# Patient Record
(demographics unavailable — no encounter records)

---

## 2025-02-27 NOTE — PHYSICAL EXAM
[Well-appearing] : well-appearing [Normocephalic] : normocephalic [No dysmorphic facial features] : no dysmorphic facial features [Neck supple] : neck supple [Straight] : straight [No deformities] : no deformities [Alert] : alert [Well related, good eye contact] : well related, good eye contact [Conversant] : conversant [Normal speech and language] : normal speech and language [Follows instructions well] : follows instructions well [VFF] : VFF [Pupils reactive to light and accommodation] : pupils reactive to light and accommodation [Full extraocular movements] : full extraocular movements [Normal facial sensation to light touch] : normal facial sensation to light touch [No facial asymmetry or weakness] : no facial asymmetry or weakness [Gross hearing intact] : gross hearing intact [Equal palate elevation] : equal palate elevation [Good shoulder shrug] : good shoulder shrug [Normal tongue movement] : normal tongue movement [Midline tongue, no fasciculations] : midline tongue, no fasciculations [Normal axial and appendicular muscle tone] : normal axial and appendicular muscle tone [Gets up on table without difficulty] : gets up on table without difficulty [No pronator drift] : no pronator drift [Normal finger tapping and fine finger movements] : normal finger tapping and fine finger movements [No abnormal involuntary movements] : no abnormal involuntary movements [5/5 strength in proximal and distal muscles of arms and legs] : 5/5 strength in proximal and distal muscles of arms and legs [Walks and runs well] : walks and runs well [Able to do deep knee bend] : able to do deep knee bend [Able to walk on heels] : able to walk on heels [Able to walk on toes] : able to walk on toes [2+ biceps] : 2+ biceps [Knee jerks] : knee jerks [Localizes LT and temperature] : localizes LT and temperature [No dysmetria on FTNT] : no dysmetria on FTNT [Good walking balance] : good walking balance [Normal gait] : normal gait [Able to tandem well] : able to tandem well [Negative Romberg] : negative Romberg [de-identified] : Breathing even and unlabored

## 2025-02-27 NOTE — BIRTH HISTORY
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

## 2025-02-27 NOTE — PLAN
[FreeTextEntry1] : [ ] [place] questionnaires given to mother for parent and teacher [ ] Discussed use of Omega 3 fish oil and magnesium  [ ] Given PCIT.org for resource  [ ] Discussed use of medications as well as side effects if accommodations do not improve school performance [ ] Follow up 1 month to review [place] questionnaires, call main office to schedule

## 2025-02-27 NOTE — HISTORY OF PRESENT ILLNESS
[FreeTextEntry1] : JAMES is a 5 year old male here for initial evaluation of inattention.   According to Adopted mother. James was adopted days after birth. 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 Academically, he has no issues, he is on grade level or above. Teacher has extra rewards in place to help keep him on task, such as an incentive chart. Teacher gives him jobs around the classroom to allow for movement. From early age, parent was concerned for ADHD. He is out of the seat often, constantly fidgeting and has trouble remaining on task. He has issues with boundaries and personal space. Requires a lot of prompting and redirection but he is not oppositional at school.   Educational assessment:  Current Grade:   Current District: [facility] ED/ Current Accommodations/ICT: General education  [place] [facility] [place] while watching tv, James is often upside down. Parent feels she is correcting behaviors all day. He is impulsive may hit parent or get into someone's face when upset. Issues with emotional regulation. He participates in a lot of sports; he can focus at first then he lacks focus and not motivated. He sees a counselor biweekly at this time, to talk about what to do when he is angry. He has limited screen time. He can do homework quickly when he wants to, but he is easily distracted by his surroundings or may openly refuse it.  He requires a lot of prompting and redirection in the morning but if he wakes up well, he can go through his routine. He struggles to stay still during meals. He has an older sister who is 7 year old, argue often. Socially, he does well with others, but other times he is off alone. No concern for anxiety, depression, OCD, ODD. He is remorseful when he behaves inappropriately. When he gets very mad, he cannot control it. Martial arts 2x/wk in the evening.  He had mild sleep apnea during a sleep study, pending tonsillectomy.  He is a restless sleeper, moves often according to parent. Bedtime: 8pm falls asleep, falls asleep right away. He wakes up at 7 am.  He can do a multistep direction if he wants to.  Denies staring, eye fluttering, twitching, seizure or seizure-like activity. No serious head injury, meningoencephalitis.

## 2025-02-27 NOTE — HISTORY OF PRESENT ILLNESS
[FreeTextEntry1] : JAMES is a 5 year old male here for initial evaluation of inattention.   According to Adopted mother. James was adopted days after birth. 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 Academically, he has no issues, he is on grade level or above. Teacher has extra rewards in place to help keep him on task, such as an incentive chart. Teacher gives him jobs around the classroom to allow for movement. From early age, parent was concerned for ADHD. He is out of the seat often, constantly fidgeting and has trouble remaining on task. He has issues with boundaries and personal space. Requires a lot of prompting and redirection but he is not oppositional at school.   Educational assessment:  Current Grade:   Current District: [facility] ED/ Current Accommodations/ICT: General education  [place] [facility] [place], while watching tv, James is often upside down. Parent feels she is correcting behaviors all day. He is impulsive may hit parent or get into someone's face when upset. Issues with emotional regulation. He participates in a lot of sports; he can focus at first then he lacks focus and not motivated. He sees a counselor biweekly at this time, to talk about what to do when he is angry. He has limited screen time. He can do homework quickly when he wants to, but he is easily distracted by his surroundings or may openly refuse it.  He requires a lot of prompting and redirection in the morning but if he wakes up well, he can go through his routine. He struggles to stay still during meals. He has an older sister who is 7 year old, argue often. Socially, he does well with others, but other times he is off alone. No concern for anxiety, depression, OCD, ODD. He is remorseful when he behaves inappropriately. When he gets very mad, he cannot control it. Martial arts 2x/wk in the evening.  He had mild sleep apnea during a sleep study, pending tonsillectomy.  He is a restless sleeper, moves often according to parent. Bedtime: 8pm falls asleep, falls asleep right away. He wakes up at 7 am.  He can do a multistep direction if he wants to.  Denies staring, eye fluttering, twitching, seizure or seizure-like activity. No serious head injury, meningoencephalitis.

## 2025-02-27 NOTE — PLAN
[FreeTextEntry1] : [ ] [place] questionnaires given to mother for parent and teacher [ ] Discussed use of Omega 3 fish oil and magnesium  [ ] Given PCIT.org for resource  [ ] Discussed use of medications as well as side effects if accommodations do not improve school performance [ ] Follow up 1 month to review [place] questionnaires, call main office to schedule

## 2025-02-27 NOTE — ASSESSMENT
[FreeTextEntry1] :  EMILIO is a 5 year old male presenting for initial evaluation of inattention/hyperactivity   EMILIO is in a general education  classroom setting in [facility].   No concerns for staring episodes, twitching, rapid eye blinking or seizure-like activity. Non focal neurological exam. Will proceed with ADHD evaluation using [place] forms.

## 2025-02-27 NOTE — PHYSICAL EXAM
[Well-appearing] : well-appearing [Normocephalic] : normocephalic [No dysmorphic facial features] : no dysmorphic facial features [Neck supple] : neck supple [Straight] : straight [No deformities] : no deformities [Alert] : alert [Well related, good eye contact] : well related, good eye contact [Conversant] : conversant [Normal speech and language] : normal speech and language [Follows instructions well] : follows instructions well [VFF] : VFF [Pupils reactive to light and accommodation] : pupils reactive to light and accommodation [Full extraocular movements] : full extraocular movements [Normal facial sensation to light touch] : normal facial sensation to light touch [No facial asymmetry or weakness] : no facial asymmetry or weakness [Gross hearing intact] : gross hearing intact [Equal palate elevation] : equal palate elevation [Good shoulder shrug] : good shoulder shrug [Normal tongue movement] : normal tongue movement [Midline tongue, no fasciculations] : midline tongue, no fasciculations [Normal axial and appendicular muscle tone] : normal axial and appendicular muscle tone [Gets up on table without difficulty] : gets up on table without difficulty [No pronator drift] : no pronator drift [Normal finger tapping and fine finger movements] : normal finger tapping and fine finger movements [No abnormal involuntary movements] : no abnormal involuntary movements [5/5 strength in proximal and distal muscles of arms and legs] : 5/5 strength in proximal and distal muscles of arms and legs [Walks and runs well] : walks and runs well [Able to do deep knee bend] : able to do deep knee bend [Able to walk on heels] : able to walk on heels [Able to walk on toes] : able to walk on toes [2+ biceps] : 2+ biceps [Knee jerks] : knee jerks [Localizes LT and temperature] : localizes LT and temperature [No dysmetria on FTNT] : no dysmetria on FTNT [Good walking balance] : good walking balance [Normal gait] : normal gait [Able to tandem well] : able to tandem well [Negative Romberg] : negative Romberg [de-identified] : Breathing even and unlabored

## 2025-02-27 NOTE — ASSESSMENT
[FreeTextEntry1] :  EMILIO is a 5 year old male presenting for initial evaluation of inattention/hyperactivity   EMILIO is in a general education  classroom setting in [facility].   No concerns for staring episodes, twitching, rapid eye blinking or seizure-like activity. Non focal neurological exam. Will proceed with ADHD evaluation using [place] forms.

## 2025-05-01 NOTE — REASON FOR VISIT
[Follow-Up Evaluation] : a follow-up evaluation for [Mother] : mother [Home] : at home, [unfilled] , at the time of the visit. [Medical Office: ([facility])___] : at the medical office located in  [Telehealth (audio & video)] : This visit was provided via telehealth using real-time 2-way audio visual technology. [FreeTextEntry2] : inattention, hyperactivity  [FreeTextEntry3] : Mother

## 2025-05-01 NOTE — BIRTH HISTORY
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

## 2025-05-01 NOTE — REASON FOR VISIT
[Follow-Up Evaluation] : a follow-up evaluation for [Mother] : mother [Home] : at home, [unfilled] , at the time of the visit. [Medical Office: ([facility])___] : at the medical office located in  [Telehealth (audio & video)] : This visit was provided via telehealth using real-time 2-way audio visual technology. [FreeTextEntry2] : inattention, hyperactivity  [FreeTextEntry3] : Mother

## 2025-05-01 NOTE — CONSULT LETTER
[Dear  ___] : Dear  [unfilled], [Please see my note below.] : Please see my note below. [Sincerely,] : Sincerely, [FreeTextEntry3] : Eva Bennett CPNP Certified Pediatric Nurse Practitioner  Pediatric Neurology  [facility]

## 2025-05-01 NOTE — HISTORY OF PRESENT ILLNESS
[FreeTextEntry1] : JAMES is a 6 year old male here for follow  up evaluation of ADHD combined type.  Current Grade:   Current District: [facility] ED/ Current Accommodations/ICT: General education  James was last seen in 2/2025. Mother notes he has been doing okay since last visit.  He is doing very well academically in school. Mother notes he has very accommodating teachers and therefore there have been minimal behavior concerns from teachers. His home behavior is very difficult to manage.  He is very oppositional and will give an attitude to parents, give a lot of back talk and can sometimes be aggressive towards parents.  Mother notes it is very difficult to take him out in public as he will try to run off and is difficult to manage due to impulsivity. He struggles to get  along with his sister.  Mother notes even in the house he cannot be left unsupervised for extended periods of time therefore Mother typically will not shower unless Father is home to supervise him.  While he is able to complete homework without difficulty it often turns into a fight. Socially he does better in unfamiliar situations as once he is comfortable, he can become mean and cannot compromise.   [place] questionnaires were completed after last visit by parents and teacher.    Parents responses:  Inattention 9/9- (6/9).    Hyperactivity 9/9 (6/9)  ODD: 4/8. (4/8)  Conduct disorder: 0/14 (3/14)  Anxiety/ Depression: 3/7 (3/7)    Teachers responses:   Inattention 9/9- (6/9).    Hyperactivity 7/9 (6/9)  ODD/ Conduct: 0/10. (3/10)  Anxiety/ Depression: 1/7 (3/7 )    Performance questions: Parents: Areas of concern include relationship with parents, siblings and peers.  Teachers: Areas of concern include following directions, disrupting class, assignment completion and organizational skills.  Initial Visit:  According to Adopted mother. James was adopted days after birth. 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 Academically, he has no issues, he is on grade level or above. Teacher has extra rewards in place to help keep him on task, such as an incentive chart. Teacher gives him jobs around the classroom to allow for movement. From early age, parent was concerned for ADHD. He is out of the seat often, constantly fidgeting and has trouble remaining on task. He has issues with boundaries and personal space. Requires a lot of prompting and redirection but he is not oppositional at school.   Educational assessment:  Current Grade:   Current District: [facility]/ Current Accommodations/ICT: General education  Home assessment: [facility] [place] notes, while watching tv, James is often upside down. Parent feels she is correcting behaviors all day. He is impulsive may hit parent or get into someone's face when upset. Issues with emotional regulation. He participates in a lot of sports; he can focus at first then he lacks focus and not motivated. He sees a counselor biweekly at this time, to talk about what to do when he is angry. He has limited screen time. He can do homework quickly when he wants to, but he is easily distracted by his surroundings or may openly refuse it.  He requires a lot of prompting and redirection in the morning but if he wakes up well, he can go through his routine. He struggles to stay still during meals. He has an older sister who is 7 year old, argue often. Socially, he does well with others, but other times he is off alone. No concern for anxiety, depression, OCD, ODD. He is remorseful when he behaves inappropriately. When he gets very mad, he cannot control it. Martial arts 2x/wk in the evening.  He had mild sleep apnea during a sleep study, pending tonsillectomy.  He is a restless sleeper, moves often according to parent. Bedtime: 8pm falls asleep, falls asleep right away. He wakes up at 7 am.  He can do a multistep direction if he wants to.  Denies staring, eye fluttering, twitching, seizure or seizure-like activity. No serious head injury, meningoencephalitis.

## 2025-05-01 NOTE — CONSULT LETTER
[Dear  ___] : Dear  [unfilled], [Please see my note below.] : Please see my note below. [Sincerely,] : Sincerely, [FreeTextEntry3] : Eva Bennett CPNP Certified Pediatric Nurse Practitioner  Pediatric Neurology  [facility]

## 2025-05-01 NOTE — BIRTH HISTORY
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

## 2025-05-01 NOTE — PHYSICAL EXAM
[Neck supple] : neck supple [Straight] : straight [No deformities] : no deformities [Follows instructions well] : follows instructions well [VFF] : VFF [Pupils reactive to light and accommodation] : pupils reactive to light and accommodation [Full extraocular movements] : full extraocular movements [Normal facial sensation to light touch] : normal facial sensation to light touch [No facial asymmetry or weakness] : no facial asymmetry or weakness [Gross hearing intact] : gross hearing intact [Equal palate elevation] : equal palate elevation [Good shoulder shrug] : good shoulder shrug [Normal tongue movement] : normal tongue movement [Midline tongue, no fasciculations] : midline tongue, no fasciculations [Normal axial and appendicular muscle tone] : normal axial and appendicular muscle tone [Gets up on table without difficulty] : gets up on table without difficulty [No pronator drift] : no pronator drift [Normal finger tapping and fine finger movements] : normal finger tapping and fine finger movements [5/5 strength in proximal and distal muscles of arms and legs] : 5/5 strength in proximal and distal muscles of arms and legs [Walks and runs well] : walks and runs well [Able to do deep knee bend] : able to do deep knee bend [Able to walk on heels] : able to walk on heels [Able to walk on toes] : able to walk on toes [2+ biceps] : 2+ biceps [Knee jerks] : knee jerks [Localizes LT and temperature] : localizes LT and temperature [No dysmetria on FTNT] : no dysmetria on FTNT [Good walking balance] : good walking balance [Able to tandem well] : able to tandem well [Negative Romberg] : negative Romberg [de-identified] : Breathing even and unlabored  [Well-appearing] : well-appearing [Normocephalic] : normocephalic [No dysmorphic facial features] : no dysmorphic facial features [Alert] : alert [Well related, good eye contact] : well related, good eye contact [Conversant] : conversant [Normal speech and language] : normal speech and language [No abnormal involuntary movements] : no abnormal involuntary movements [Normal gait] : normal gait

## 2025-05-01 NOTE — BIRTH HISTORY
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

## 2025-05-01 NOTE — ASSESSMENT
[FreeTextEntry1] :  EMILIO is a 6 year old male with increasing concerns for inattention and hyperactivity at home and in school.  Also with concerns for defiant behaviors primarily in home.   Recent questionnaires completed by parents and teachers consistent with diagnosis of ADHD combined type. Mother would like to give a good trial of Omega 3 before discussing prescription medication.

## 2025-05-01 NOTE — PHYSICAL EXAM
[Neck supple] : neck supple [Straight] : straight [No deformities] : no deformities [Follows instructions well] : follows instructions well [VFF] : VFF [Pupils reactive to light and accommodation] : pupils reactive to light and accommodation [Full extraocular movements] : full extraocular movements [Normal facial sensation to light touch] : normal facial sensation to light touch [No facial asymmetry or weakness] : no facial asymmetry or weakness [Gross hearing intact] : gross hearing intact [Equal palate elevation] : equal palate elevation [Good shoulder shrug] : good shoulder shrug [Normal tongue movement] : normal tongue movement [Midline tongue, no fasciculations] : midline tongue, no fasciculations [Normal axial and appendicular muscle tone] : normal axial and appendicular muscle tone [Gets up on table without difficulty] : gets up on table without difficulty [No pronator drift] : no pronator drift [Normal finger tapping and fine finger movements] : normal finger tapping and fine finger movements [5/5 strength in proximal and distal muscles of arms and legs] : 5/5 strength in proximal and distal muscles of arms and legs [Walks and runs well] : walks and runs well [Able to do deep knee bend] : able to do deep knee bend [Able to walk on heels] : able to walk on heels [Able to walk on toes] : able to walk on toes [2+ biceps] : 2+ biceps [Knee jerks] : knee jerks [Localizes LT and temperature] : localizes LT and temperature [No dysmetria on FTNT] : no dysmetria on FTNT [Good walking balance] : good walking balance [Able to tandem well] : able to tandem well [Negative Romberg] : negative Romberg [de-identified] : Breathing even and unlabored  [Well-appearing] : well-appearing [Normocephalic] : normocephalic [No dysmorphic facial features] : no dysmorphic facial features [Alert] : alert [Well related, good eye contact] : well related, good eye contact [Conversant] : conversant [Normal speech and language] : normal speech and language [No abnormal involuntary movements] : no abnormal involuntary movements [Normal gait] : normal gait

## 2025-05-01 NOTE — CONSULT LETTER
[Dear  ___] : Dear  [unfilled], [Please see my note below.] : Please see my note below. [Sincerely,] : Sincerely, [FreeTextEntry3] : Eva Bennett CPNP Certified Pediatric Nurse Practitioner  Pediatric Neurology  [facility]

## 2025-05-01 NOTE — HISTORY OF PRESENT ILLNESS
[FreeTextEntry1] : JAMES is a 6 year old male here for follow  up evaluation of ADHD combined type.  Current Grade:   Current District: [facility] ED/ Current Accommodations/ICT: General education  James was last seen in 2/2025. Mother notes he has been doing okay since last visit.  He is doing very well academically in school. Mother notes he has very accommodating teachers and therefore there have been minimal behavior concerns from teachers. His home behavior is very difficult to manage.  He is very oppositional and will give an attitude to parents, give a lot of back talk and can sometimes be aggressive towards parents.  Mother notes it is very difficult to take him out in public as he will try to run off and is difficult to manage due to impulsivity. He struggles to get  along with his sister.  Mother notes even in the house he cannot be left unsupervised for extended periods of time therefore Mother typically will not shower unless Father is home to supervise him.  While he is able to complete homework without difficulty it often turns into a fight. Socially he does better in unfamiliar situations as once he is comfortable, he can become mean and cannot compromise.   [place] questionnaires were completed after last visit by parents and teacher.    Parents responses:  Inattention 9/9- (6/9).    Hyperactivity 9/9 (6/9)  ODD: 4/8. (4/8)  Conduct disorder: 0/14 (3/14)  Anxiety/ Depression: 3/7 (3/7)    Teachers responses:   Inattention 9/9- (6/9).    Hyperactivity 7/9 (6/9)  ODD/ Conduct: 0/10. (3/10)  Anxiety/ Depression: 1/7 (3/7 )    Performance questions: Parents: Areas of concern include relationship with parents, siblings and peers.  Teachers: Areas of concern include following directions, disrupting class, assignment completion and organizational skills.  Initial Visit:  According to Adopted mother. James was adopted days after birth. 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 Academically, he has no issues, he is on grade level or above. Teacher has extra rewards in place to help keep him on task, such as an incentive chart. Teacher gives him jobs around the classroom to allow for movement. From early age, parent was concerned for ADHD. He is out of the seat often, constantly fidgeting and has trouble remaining on task. He has issues with boundaries and personal space. Requires a lot of prompting and redirection but he is not oppositional at school.   Educational assessment:  Current Grade:   Current District: [facility]/ Current Accommodations/ICT: General education  [place] [facility] [place] while [place] tv, James is often upside down. Parent feels she is correcting behaviors all day. He is impulsive may hit parent or get into someone's face when upset. Issues with emotional regulation. He participates in a lot of sports; he can focus at first then he lacks focus and not motivated. He sees a counselor biweekly at this time, to talk about what to do when he is angry. He has limited screen time. He can do homework quickly when he wants to, but he is easily distracted by his surroundings or may openly refuse it.  He requires a lot of prompting and redirection in the morning but if he wakes up well, he can go through his routine. He struggles to stay still during meals. He has an older sister who is 7 year old, argue often. Socially, he does well with others, but other times he is off alone. No concern for anxiety, depression, OCD, ODD. He is remorseful when he behaves inappropriately. When he gets very mad, he cannot control it. Martial arts 2x/wk in the evening.  He had mild sleep apnea during a sleep study, pending tonsillectomy.  He is a restless sleeper, moves often according to parent. Bedtime: 8pm falls asleep, falls asleep right away. He wakes up at 7 am.  He can do a multistep direction if he wants to.  Denies staring, eye fluttering, twitching, seizure or seizure-like activity. No serious head injury, meningoencephalitis.

## 2025-05-01 NOTE — PHYSICAL EXAM
[Neck supple] : neck supple [Straight] : straight [No deformities] : no deformities [Follows instructions well] : follows instructions well [VFF] : VFF [Pupils reactive to light and accommodation] : pupils reactive to light and accommodation [Full extraocular movements] : full extraocular movements [Normal facial sensation to light touch] : normal facial sensation to light touch [No facial asymmetry or weakness] : no facial asymmetry or weakness [Gross hearing intact] : gross hearing intact [Equal palate elevation] : equal palate elevation [Good shoulder shrug] : good shoulder shrug [Normal tongue movement] : normal tongue movement [Midline tongue, no fasciculations] : midline tongue, no fasciculations [Normal axial and appendicular muscle tone] : normal axial and appendicular muscle tone [Gets up on table without difficulty] : gets up on table without difficulty [No pronator drift] : no pronator drift [Normal finger tapping and fine finger movements] : normal finger tapping and fine finger movements [5/5 strength in proximal and distal muscles of arms and legs] : 5/5 strength in proximal and distal muscles of arms and legs [Walks and runs well] : walks and runs well [Able to do deep knee bend] : able to do deep knee bend [Able to walk on heels] : able to walk on heels [Able to walk on toes] : able to walk on toes [2+ biceps] : 2+ biceps [Knee jerks] : knee jerks [Localizes LT and temperature] : localizes LT and temperature [No dysmetria on FTNT] : no dysmetria on FTNT [Good walking balance] : good walking balance [Able to tandem well] : able to tandem well [Negative Romberg] : negative Romberg [de-identified] : Breathing even and unlabored  [Well-appearing] : well-appearing [Normocephalic] : normocephalic [No dysmorphic facial features] : no dysmorphic facial features [Alert] : alert [Well related, good eye contact] : well related, good eye contact [Conversant] : conversant [Normal speech and language] : normal speech and language [No abnormal involuntary movements] : no abnormal involuntary movements [Normal gait] : normal gait

## 2025-05-01 NOTE — REASON FOR VISIT
[Follow-Up Evaluation] : a follow-up evaluation for [Mother] : mother [Home] : at home, [unfilled] , at the time of the visit. [Medical Office: ([facility])___] : at the medical office located in  [Telehealth (audio & video)] : This visit was provided via telehealth using real-time 2-way audio visual technology. [FreeTextEntry2] : inattention, hyperactivity  [FreeTextEntry3] : Mother

## 2025-05-01 NOTE — HISTORY OF PRESENT ILLNESS
[FreeTextEntry1] : JAMES is a 6 year old male here for follow  up evaluation of ADHD combined type.  Current Grade:   Current District: [facility] ED/ Current Accommodations/ICT: General education  James was last seen in 2/2025. Mother notes he has been doing okay since last visit.  He is doing very well academically in school. Mother notes he has very accommodating teachers and therefore there have been minimal behavior concerns from teachers. His home behavior is very difficult to manage.  He is very oppositional and will give an attitude to parents, give a lot of back talk and can sometimes be aggressive towards parents.  Mother notes it is very difficult to take him out in public as he will try to run off and is difficult to manage due to impulsivity. He struggles to get  along with his sister.  Mother notes even in the house he cannot be left unsupervised for extended periods of time therefore Mother typically will not shower unless Father is home to supervise him.  While he is able to complete homework without difficulty it often turns into a fight. Socially he does better in unfamiliar situations as once he is comfortable, he can become mean and cannot compromise.   [place] questionnaires were completed after last visit by parents and teacher.    Parents responses:  Inattention 9/9- (6/9).    Hyperactivity 9/9 (6/9)  ODD: 4/8. (4/8)  Conduct disorder: 0/14 (3/14)  Anxiety/ Depression: 3/7 (3/7)    Teachers responses:   Inattention 9/9- (6/9).    Hyperactivity 7/9 (6/9)  ODD/ Conduct: 0/10. (3/10)  Anxiety/ Depression: 1/7 (3/7 )    Performance questions: Parents: Areas of concern include relationship with parents, siblings and peers.  Teachers: Areas of concern include following directions, disrupting class, assignment completion and organizational skills.  Initial Visit:  According to Adopted mother. James was adopted days after birth. 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 Academically, he has no issues, he is on grade level or above. Teacher has extra rewards in place to help keep him on task, such as an incentive chart. Teacher gives him jobs around the classroom to allow for movement. From early age, parent was concerned for ADHD. He is out of the seat often, constantly fidgeting and has trouble remaining on task. He has issues with boundaries and personal space. Requires a lot of prompting and redirection but he is not oppositional at school.   Educational assessment:  Current Grade:   Current District: [facility] General ED/ Current Accommodations/ICT: General education  Home assessment: [facility] notes, while watching tv, James is often upside down. Parent feels she is correcting behaviors all day. He is impulsive may hit parent or get into someone's face when upset. Issues with emotional regulation. He participates in a lot of sports; he can focus at first then he lacks focus and not motivated. He sees a counselor biweekly at this time, to talk about what to do when he is angry. He has limited screen time. He can do homework quickly when he wants to, but he is easily distracted by his surroundings or may openly refuse it.  He requires a lot of prompting and redirection in the morning but if he wakes up well, he can go through his routine. He struggles to stay still during meals. He has an older sister who is 7 year old, argue often. Socially, he does well with others, but other times he is off alone. No concern for anxiety, depression, OCD, ODD. He is remorseful when he behaves inappropriately. When he gets very mad, he cannot control it. Martial arts 2x/wk in the evening.  He had mild sleep apnea during a sleep study, pending tonsillectomy.  He is a restless sleeper, moves often according to parent. Bedtime: 8pm falls asleep, falls asleep right away. He wakes up at 7 am.  He can do a multistep direction if he wants to.  Denies staring, eye fluttering, twitching, seizure or seizure-like activity. No serious head injury, meningoencephalitis.

## 2025-05-01 NOTE — PLAN
[FreeTextEntry1] :  [ ] Discussed use of Omega 3 fish oil and magnesium  [ ] Discussed use of medications as well as side effects if accommodations do not improve school performance [ ] Follow up 6 months- sooner for concerns

## 2025-07-24 NOTE — PHYSICAL EXAM
[Well-appearing] : well-appearing [Normocephalic] : normocephalic [No dysmorphic facial features] : no dysmorphic facial features [Alert] : alert [Well related, good eye contact] : well related, good eye contact [Conversant] : conversant [Normal speech and language] : normal speech and language [No abnormal involuntary movements] : no abnormal involuntary movements [Normal gait] : normal gait

## 2025-07-25 NOTE — PLAN
[FreeTextEntry1] : [ ] Discussed use of Omega 3 fish oil and magnesium  -Discussed with parents trial of stimulant for ADHD management in addition to school accommodations.  Parents in agreement with plan.  Will start Metadate 10mg. Discussed potential side effects of stimulant medications including but not limited to increased heart rate and blood pressure, decreased appetite, decreased growth velocity, headache, stomachache, delayed sleep onset, tics, moodiness, etc. -Cardiac risk factors for treatment of stimulant medications were reviewed, including history of prior seizure, unexplained loss of consciousness, congenital heart disease, arrhythmias, or family history of sudden unexplained cardiac death in family members below the age of 40.  - Follow up 1 month via TEB.  Parents to call office for concerns of side effects

## 2025-07-25 NOTE — REASON FOR VISIT
[Follow-Up Evaluation] : a follow-up evaluation for [Mother] : mother [Home] : at home, [unfilled] , at the time of the visit. [Medical Office: ([facility])___] : at the medical office located in  [Telehealth (audio & video)] : This visit was provided via telehealth using real-time 2-way audio visual technology. [FreeTextEntry2] : inattention, hyperactivity  [FreeTextEntry3] : Mother

## 2025-07-25 NOTE — CONSULT LETTER
[Dear  ___] : Dear  [unfilled], [Please see my note below.] : Please see my note below. [Sincerely,] : Sincerely, [FreeTextEntry3] : Eva Bennett CPNP Certified Pediatric Nurse Practitioner  Pediatric Neurology  [facility]

## 2025-07-25 NOTE — HISTORY OF PRESENT ILLNESS
[FreeTextEntry1] : JAMES is a 6 year old male here for follow up evaluation of ADHD combined type.  Current Grade:  Finished   Current District: [facility] General ED/ Current Accommodations/ICT: General education- 504 accommodations   James was last seen in 4/2025. Mother notes he has been doing okay since last visit. He did very well academically.  Mother notes his  went out on leave and initially there were some concerns regarding behavior from replacement teacher but it was quickly worked out.  He was approved for formal 504 accommodations for next school year.  He has been having a good summer- behavior is overall manageable but still with concerns for hyperactivity as well as impulsivity.  He is very oppositional and will give an attitude to parents, give a lot of back talk and can sometimes be aggressive towards parents.  Mother notes it is very difficult to take him out in public as he will try to run off and is difficult to manage due to impulsivity. He struggles to get  along with his sister.  While he is able to complete homework without difficulty it often turns into a fight. Socially he does better in unfamiliar situations as once he is comfortable, he can become mean and cannot compromise.  Mother reports even in team sports he is often getting yelled at for inattention and impulsive behaviors.   Parents worry about his academic progression as expectations increase in school and would like to trial a medication primarily for school days.    Initial Visit:  According to Adopted mother. James was adopted days after birth. 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 Academically, he has no issues, he is on grade level or above. Teacher has extra rewards in place to help keep him on task, such as an incentive chart. Teacher gives him jobs around the classroom to allow for movement. From early age, parent was concerned for ADHD. He is out of the seat often, constantly fidgeting and has trouble remaining on task. He has issues with boundaries and personal space. Requires a lot of prompting and redirection but he is not oppositional at school.   Educational assessment:  Current Grade:   Current District: [facility] ED/ Current Accommodations/ICT: General education  Home assessment: [facility] notes, while watching tv, James is often upside down. Parent feels she is correcting behaviors all day. He is impulsive may hit parent or get into someone's face when upset. Issues with emotional regulation. He participates in a lot of sports; he can focus at first then he lacks focus and not motivated. He sees a counselor biweekly at this time, to talk about what to do when he is angry. He has limited screen time. He can do homework quickly when he wants to, but he is easily distracted by his surroundings or may openly refuse it.  He requires a lot of prompting and redirection in the morning but if he wakes up well, he can go through his routine. He struggles to stay still during meals. He has an older sister who is 7 year old, argue often. Socially, he does well with others, but other times he is off alone. No concern for anxiety, depression, OCD, ODD. He is remorseful when he behaves inappropriately. When he gets very mad, he cannot control it. Martial arts 2x/wk in the evening.  He had mild sleep apnea during a sleep study, pending tonsillectomy.  He is a restless sleeper, moves often according to parent. Bedtime: 8pm falls asleep, falls asleep right away. He wakes up at 7 am.  He can do a multistep direction if he wants to.  Denies staring, eye fluttering, twitching, seizure or seizure-like activity. No serious head injury, meningoencephalitis.

## 2025-07-25 NOTE — BIRTH HISTORY
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

## 2025-07-25 NOTE — ASSESSMENT
[FreeTextEntry1] :  EMILIO is a 6 year old male with  ADHD combined type. Still with concerns for impulsivity as well as defiant behaviors. Concerns for academic performance in the future being limited by behavior

## 2025-07-25 NOTE — REASON FOR VISIT
[Follow-Up Evaluation] : a follow-up evaluation for [Mother] : mother [Home] : at home, [unfilled] , at the time of the visit. [Medical Office: ([facility])___] : at the medical office located in  [Telehealth (audio & video)] : This visit was provided via telehealth using real-time 2-way audio visual technology. [FreeTextEntry2] : inattention, hyperactivity  [FreeTextEntry3] : Mother

## 2025-07-25 NOTE — DATA REVIEWED
[FreeTextEntry1] : [place] questionnaires were completed 3/5/2025 by parents and teacher.    Parents responses:  Inattention 9/9- (6/9).    Hyperactivity 9/9 (6/9)  ODD: 4/8. (4/8)  Conduct disorder: 0/14 (3/14)  Anxiety/ Depression: 3/7 (3/7)    Teachers responses:   Inattention 9/9- (6/9).    Hyperactivity 7/9 (6/9)  ODD/ Conduct: 0/10. (3/10)  Anxiety/ Depression: 1/7 (3/7 )    Performance questions: Parents: Areas of concern include relationship with parents, siblings and peers.  Teachers: Areas of concern include following directions, disrupting class, assignment completion and organizational skills.

## 2025-07-25 NOTE — CONSULT LETTER
[Dear  ___] : Dear  [unfilled], [Please see my note below.] : Please see my note below. [Sincerely,] : Sincerely, [FreeTextEntry3] : Eva Bennett CPNP Certified Pediatric Nurse Practitioner  Pediatric Neurology  [facility]

## 2025-07-25 NOTE — HISTORY OF PRESENT ILLNESS
[FreeTextEntry1] : JAMES is a 6 year old male here for follow up evaluation of ADHD combined type.  Current Grade:  Finished   Current District: [facility] ED/ Current Accommodations/ICT: General education- 504 accommodations   James was last seen in 4/2025. Mother notes he has been doing okay since last visit. He did very well academically.  Mother notes his  went out on leave and initially there were some concerns regarding behavior from replacement teacher but it was quickly worked out.  He was approved for formal 504 accommodations for next school year.  He has been having a good summer- behavior is overall manageable but still with concerns for hyperactivity as well as impulsivity.  He is very oppositional and will give an attitude to parents, give a lot of back talk and can sometimes be aggressive towards parents.  Mother notes it is very difficult to take him out in public as he will try to run off and is difficult to manage due to impulsivity. He struggles to get  along with his sister.  While he is able to complete homework without difficulty it often turns into a fight. Socially he does better in unfamiliar situations as once he is comfortable, he can become mean and cannot compromise.  Mother reports even in team sports he is often getting yelled at for inattention and impulsive behaviors.   Parents worry about his academic progression as expectations increase in school and would like to trial a medication primarily for school days.    Initial Visit:  According to Adopted mother. James was adopted days after birth. 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 Academically, he has no issues, he is on grade level or above. Teacher has extra rewards in place to help keep him on task, such as an incentive chart. Teacher gives him jobs around the classroom to allow for movement. From early age, parent was concerned for ADHD. He is out of the seat often, constantly fidgeting and has trouble remaining on task. He has issues with boundaries and personal space. Requires a lot of prompting and redirection but he is not oppositional at school.   Educational assessment:  Current Grade:   Current District: [facility] ED/ Current Accommodations/ICT: General education  Home assessment: [facility] notes, while watching tv, James is often upside down. Parent feels she is correcting behaviors all day. He is impulsive may hit parent or get into someone's face when upset. Issues with emotional regulation. He participates in a lot of sports; he can focus at first then he lacks focus and not motivated. He sees a counselor biweekly at this time, to talk about what to do when he is angry. He has limited screen time. He can do homework quickly when he wants to, but he is easily distracted by his surroundings or may openly refuse it.  He requires a lot of prompting and redirection in the morning but if he wakes up well, he can go through his routine. He struggles to stay still during meals. He has an older sister who is 7 year old, argue often. Socially, he does well with others, but other times he is off alone. No concern for anxiety, depression, OCD, ODD. He is remorseful when he behaves inappropriately. When he gets very mad, he cannot control it. Martial arts 2x/wk in the evening.  He had mild sleep apnea during a sleep study, pending tonsillectomy.  He is a restless sleeper, moves often according to parent. Bedtime: 8pm falls asleep, falls asleep right away. He wakes up at 7 am.  He can do a multistep direction if he wants to.  Denies staring, eye fluttering, twitching, seizure or seizure-like activity. No serious head injury, meningoencephalitis.

## 2025-07-25 NOTE — DATA REVIEWED
[FreeTextEntry1] : [place] questionnaires were completed 3/5/2025 by parents and teacher.    Parents responses:  Inattention 9/9- (6/9).    Hyperactivity 9/9 (6/9)  ODD: 4/8. (4/8)  Conduct disorder: 0/14 (3/14)  Anxiety/ Depression: 3/7 (3/7)    Teachers responses:   Inattention 9/9- (6/9).    Hyperactivity 7/9 (6/9)  ODD/ Conduct: 0/10. (3/10)  Anxiety/ Depression: 1/7 (3/7 )    Performance questions: Parents: Areas of concern include relationship with parents, siblings and peers.  Teachers: Areas of concern include following directions, disrupting class, assignment completion and organizational skills.

## 2025-07-25 NOTE — BIRTH HISTORY
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